# Patient Record
Sex: MALE | Race: WHITE
[De-identification: names, ages, dates, MRNs, and addresses within clinical notes are randomized per-mention and may not be internally consistent; named-entity substitution may affect disease eponyms.]

---

## 2021-11-22 ENCOUNTER — HOSPITAL ENCOUNTER (EMERGENCY)
Dept: HOSPITAL 26 - MED | Age: 36
Discharge: HOME | End: 2021-11-22
Payer: COMMERCIAL

## 2021-11-22 VITALS — WEIGHT: 270 LBS | BODY MASS INDEX: 36.57 KG/M2 | HEIGHT: 72 IN

## 2021-11-22 VITALS — SYSTOLIC BLOOD PRESSURE: 153 MMHG | DIASTOLIC BLOOD PRESSURE: 84 MMHG

## 2021-11-22 DIAGNOSIS — B02.9: Primary | ICD-10-CM

## 2021-11-22 DIAGNOSIS — Z79.899: ICD-10-CM

## 2021-11-22 DIAGNOSIS — Z79.1: ICD-10-CM

## 2021-11-22 DIAGNOSIS — J45.909: ICD-10-CM

## 2021-11-22 NOTE — NUR
Patient discharged with v/s stable. Written and verbal after care instructions 
given and explained. 

Patient alert, oriented and verbalized understanding of instructions. 
Ambulatory with steady gait. All questions addressed prior to discharge. ID 
band removed. Patient advised to follow up with PMD. Rx of NAPROXEN, 
PREDNIXONE, VALACYLOVIR HCI given.  Opportunity to ask questions provided and 
answered.
